# Patient Record
Sex: MALE | Race: WHITE
[De-identification: names, ages, dates, MRNs, and addresses within clinical notes are randomized per-mention and may not be internally consistent; named-entity substitution may affect disease eponyms.]

---
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
## 2023-05-22 NOTE — HISTORY OF PRESENT ILLNESS
[de-identified] : 20 yr old with long history of milk allergy - failed baked milk challenge in 8/19 - last skin test 4/18 and last ImmunoCap 7/19\par Mom and Mulugeta are interested in trying again\par Since last visit several episodes of oral itching and mild hives with accidents exposure to cheeses that are hidden in foods. \par Needs Auvi Q called in
Ambulatory

## 2023-07-17 ENCOUNTER — LABORATORY RESULT (OUTPATIENT)
Age: 23
End: 2023-07-17

## 2023-07-18 ENCOUNTER — NON-APPOINTMENT (OUTPATIENT)
Age: 23
End: 2023-07-18

## 2023-07-18 LAB
CASEIN IGE QN: 0.14 KUA/L
COW MILK IGE QN: 1.25 KUA/L
DEPRECATED CASEIN IGE RAST QL: NORMAL
DEPRECATED COW MILK IGE RAST QL: 2

## 2023-07-19 ENCOUNTER — NON-APPOINTMENT (OUTPATIENT)
Age: 23
End: 2023-07-19

## 2023-07-19 ENCOUNTER — APPOINTMENT (OUTPATIENT)
Dept: OTOLARYNGOLOGY | Facility: CLINIC | Age: 23
End: 2023-07-19
Payer: COMMERCIAL

## 2023-07-19 VITALS
HEART RATE: 68 BPM | WEIGHT: 155 LBS | DIASTOLIC BLOOD PRESSURE: 72 MMHG | SYSTOLIC BLOOD PRESSURE: 126 MMHG | HEIGHT: 67.5 IN | BODY MASS INDEX: 24.05 KG/M2

## 2023-07-19 DIAGNOSIS — S09.91XA UNSPECIFIED INJURY OF EAR, INITIAL ENCOUNTER: ICD-10-CM

## 2023-07-19 PROCEDURE — 92557 COMPREHENSIVE HEARING TEST: CPT

## 2023-07-19 PROCEDURE — 99204 OFFICE O/P NEW MOD 45 MIN: CPT

## 2023-07-19 PROCEDURE — 92567 TYMPANOMETRY: CPT

## 2023-08-02 ENCOUNTER — TRANSCRIPTION ENCOUNTER (OUTPATIENT)
Age: 23
End: 2023-08-02

## 2023-08-03 ENCOUNTER — APPOINTMENT (OUTPATIENT)
Dept: PEDIATRIC ALLERGY IMMUNOLOGY | Facility: CLINIC | Age: 23
End: 2023-08-03
Payer: COMMERCIAL

## 2023-08-03 VITALS
HEART RATE: 60 BPM | WEIGHT: 152 LBS | HEIGHT: 67 IN | OXYGEN SATURATION: 98 % | BODY MASS INDEX: 23.86 KG/M2 | RESPIRATION RATE: 16 BRPM

## 2023-08-03 DIAGNOSIS — Z91.011 ALLERGY TO MILK PRODUCTS: ICD-10-CM

## 2023-08-03 PROCEDURE — 99213 OFFICE O/P EST LOW 20 MIN: CPT

## 2023-08-03 RX ORDER — EPINEPHRINE 0.3 MG/1
INJECTION INTRAMUSCULAR; SUBCUTANEOUS
Refills: 0 | Status: DISCONTINUED | COMMUNITY
End: 2023-08-03

## 2023-08-03 RX ORDER — CETIRIZINE HYDROCHLORIDE 10 MG/1
10 TABLET, FILM COATED ORAL
Refills: 0 | Status: DISCONTINUED | COMMUNITY
End: 2023-08-03

## 2023-08-03 RX ORDER — EPINEPHRINE 0.3 MG/.3ML
0.3 INJECTION, SOLUTION INTRAMUSCULAR
Qty: 2 | Refills: 2 | Status: ACTIVE | COMMUNITY
Start: 2023-08-03 | End: 1900-01-01

## 2023-08-03 NOTE — ASSESSMENT
[FreeTextEntry1] : 22 yr old with long history of milk allergy - now with diminishing CM Immunocaps ?? Some recent tolerance to baked milk in minimal way but ?? some oral itching with products that pt believes may be contaminated with milk but no label stating contains milk  ST -CM 9mm  Ok to keep trying baked CM at home and see if pt can figure out some threshold for tolerance Avoid cheese powders and other forms of cheese and milk Follow up one year Will send in Auvi Q 0.3

## 2023-08-03 NOTE — SOCIAL HISTORY
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

## 2023-08-03 NOTE — PHYSICAL EXAM
[Alert] : alert [Normal TMs] : both tympanic membranes were normal [No Neck Mass] : no neck mass was observed [Normal Rate and Effort] : normal respiratory rhythm and effort [Normal Cervical Lymph Nodes] : cervical [Skin Intact] : skin intact  [Conjunctival Erythema] : no conjunctival erythema [Boggy Nasal Turbinates] : no boggy and/or pale nasal turbinates [Posterior Pharyngeal Cobblestoning] : no posterior pharyngeal cobblestoning [Wheezing] : no wheezing was heard

## 2023-08-03 NOTE — HISTORY OF PRESENT ILLNESS
[de-identified] : 22  yr old last seen 7/21 with long history of milk allergy - failed baked milk challenge in 8/19 - after third dose develop OAS and a few minimal facial hives and was treated with Benadryl -  Last skin test was 6/21 - 15mm to milk and 8mm to casein  Now new Immunocaps CM- decrease from 2.5 to 1.25 - suggest ST Casein - now neg at 0.14  Since last visit several episodes of oral itching and mild hives with food that clearly do not have any milk on label - very milk - doubt milk contamination.  Occasionally OK with minimal baked milk products.